# Patient Record
Sex: FEMALE | Race: WHITE | NOT HISPANIC OR LATINO | Employment: OTHER | URBAN - METROPOLITAN AREA
[De-identification: names, ages, dates, MRNs, and addresses within clinical notes are randomized per-mention and may not be internally consistent; named-entity substitution may affect disease eponyms.]

---

## 2017-06-28 ENCOUNTER — TRANSCRIBE ORDERS (OUTPATIENT)
Dept: ADMINISTRATIVE | Facility: HOSPITAL | Age: 82
End: 2017-06-28

## 2017-06-28 ENCOUNTER — HOSPITAL ENCOUNTER (OUTPATIENT)
Dept: GENERAL RADIOLOGY | Facility: HOSPITAL | Age: 82
Discharge: HOME | End: 2017-06-28
Admitting: PHYSICIAN ASSISTANT

## 2017-06-28 DIAGNOSIS — M79.672 LEFT FOOT PAIN: Primary | ICD-10-CM

## 2017-06-28 PROCEDURE — 73630 X-RAY EXAM OF FOOT: CPT

## 2019-11-27 ENCOUNTER — TRANSCRIBE ORDERS (OUTPATIENT)
Dept: ADMINISTRATIVE | Facility: HOSPITAL | Age: 84
End: 2019-11-27

## 2019-11-27 DIAGNOSIS — G60.9 HEREDITARY AND IDIOPATHIC PERIPHERAL NEUROPATHY: Primary | ICD-10-CM

## 2021-03-02 ENCOUNTER — IMMUNIZATION (OUTPATIENT)
Dept: VACCINE CLINIC | Facility: HOSPITAL | Age: 86
End: 2021-03-02

## 2021-03-02 PROCEDURE — 91300 HC SARSCOV02 VAC 30MCG/0.3ML IM: CPT | Performed by: INTERNAL MEDICINE

## 2021-03-02 PROCEDURE — 0001A: CPT | Performed by: INTERNAL MEDICINE

## 2021-03-23 ENCOUNTER — IMMUNIZATION (OUTPATIENT)
Dept: VACCINE CLINIC | Facility: HOSPITAL | Age: 86
End: 2021-03-23

## 2021-03-23 PROCEDURE — 91300 HC SARSCOV02 VAC 30MCG/0.3ML IM: CPT | Performed by: INTERNAL MEDICINE

## 2021-03-23 PROCEDURE — 0002A: CPT | Performed by: INTERNAL MEDICINE

## 2022-01-01 ENCOUNTER — HOSPITAL ENCOUNTER (EMERGENCY)
Facility: HOSPITAL | Age: 87
End: 2022-12-09
Attending: EMERGENCY MEDICINE | Admitting: EMERGENCY MEDICINE

## 2022-01-01 ENCOUNTER — TRANSCRIBE ORDERS (OUTPATIENT)
Dept: ADMINISTRATIVE | Facility: HOSPITAL | Age: 87
End: 2022-01-01

## 2022-01-01 ENCOUNTER — HOSPITAL ENCOUNTER (OUTPATIENT)
Dept: GENERAL RADIOLOGY | Facility: HOSPITAL | Age: 87
Discharge: HOME | End: 2022-12-08
Admitting: INTERNAL MEDICINE

## 2022-01-01 VITALS — SYSTOLIC BLOOD PRESSURE: 105 MMHG | DIASTOLIC BLOOD PRESSURE: 61 MMHG | HEART RATE: 101 BPM

## 2022-01-01 DIAGNOSIS — I46.9 CARDIAC ARREST: Primary | ICD-10-CM

## 2022-01-01 DIAGNOSIS — I46.9 PULSELESS ELECTRICAL ACTIVITY: ICD-10-CM

## 2022-01-01 DIAGNOSIS — R06.00 DYSPNEA, UNSPECIFIED TYPE: Primary | ICD-10-CM

## 2022-01-01 DIAGNOSIS — Z86.79 HISTORY OF HYPERTENSION: ICD-10-CM

## 2022-01-01 DIAGNOSIS — R06.02 SHORTNESS OF BREATH: ICD-10-CM

## 2022-01-01 PROCEDURE — 99284 EMERGENCY DEPT VISIT MOD MDM: CPT

## 2022-01-01 PROCEDURE — 71046 X-RAY EXAM CHEST 2 VIEWS: CPT

## 2022-01-01 PROCEDURE — 31500 INSERT EMERGENCY AIRWAY: CPT

## 2022-01-01 PROCEDURE — 25010000002 EPINEPHRINE 1 MG/10ML SOLUTION PREFILLED SYRINGE

## 2022-01-01 PROCEDURE — 94799 UNLISTED PULMONARY SVC/PX: CPT

## 2022-01-01 PROCEDURE — 94002 VENT MGMT INPAT INIT DAY: CPT

## 2022-01-01 PROCEDURE — 92950 HEART/LUNG RESUSCITATION CPR: CPT

## 2022-01-01 RX ORDER — EPINEPHRINE 0.1 MG/ML
SYRINGE (ML) INJECTION
Status: COMPLETED | OUTPATIENT
Start: 2022-01-01 | End: 2022-01-01

## 2022-01-01 RX ORDER — CALCIUM CHLORIDE 100 MG/ML
INJECTION INTRAVENOUS; INTRAVENTRICULAR
Status: COMPLETED | OUTPATIENT
Start: 2022-01-01 | End: 2022-01-01

## 2022-01-01 RX ADMIN — EPINEPHRINE 1 MG: 0.1 INJECTION INTRACARDIAC; INTRAVENOUS at 21:50

## 2022-01-01 RX ADMIN — SODIUM BICARBONATE 50 MEQ: 84 INJECTION, SOLUTION INTRAVENOUS at 21:46

## 2022-01-01 RX ADMIN — CALCIUM CHLORIDE 1 G: 100 INJECTION INTRAVENOUS; INTRAVENTRICULAR at 21:45

## 2022-01-01 RX ADMIN — EPINEPHRINE 1 MG: 0.1 INJECTION INTRACARDIAC; INTRAVENOUS at 21:44

## 2022-01-01 RX ADMIN — EPINEPHRINE 1 MG: 0.1 INJECTION INTRACARDIAC; INTRAVENOUS at 21:47

## 2022-12-09 NOTE — ED PROVIDER NOTES
[place]    EMERGENCY DEPARTMENT ENCOUNTER      Pt Name: Mahsa Larose  MRN: [id number]  YOB: 1934  Date of evaluation: 12/8/2022  Provider: Raghu Osorio MD    CHIEF COMPLAINT       Chief Complaint   Patient presents with   • Cardiac Arrest         HISTORY OF PRESENT ILLNESS  (Location/Symptom, Timing/Onset, Context/Setting, Quality, Duration, Modifying Factors, Severity.)   Mahsa Larose is a 88 y.o. female who presents to the emergency department in cardiac arrest.  Patient had witnessed arrest about 30 minutes prior to arrival.  She was apparently on the phone with family members he noticed that she was having significant difficulty breathing.  Recently diagnosed with respiratory infection and has been having some cough and dyspnea over the course of the past couple of days per EMS report.  Patient granddaughter on the phone noticed that patient was very tachypneic and so patient's son-in-law called EMS.  EMS states that that when they arrived, patient was pulseless and CPR was being performed by bystanders who witnessed the arrest.  Patient has been in PEA throughout the duration of EMS transport.  She has been administered 3 doses of epinephrine and 1 dose of bicarbonate without any change.  An LMA was placed prior to arrival.  They are unaware of any medical history.      Nursing notes were reviewed.    REVIEW OF SYSTEMS    (2-9 systems for level 4, 10 or more for level 5)   ROS:  Unable to obtain due to clinical condition      PAST MEDICAL HISTORY     Past Medical History:   Diagnosis Date   • Hypertension    • Neuropathy          SURGICAL HISTORY       Past Surgical History:   Procedure Laterality Date   • BREAST SURGERY     • HYSTERECTOMY     • MASTECTOMY Bilateral          CURRENT MEDICATIONS     No current facility-administered medications for this encounter.    Current Outpatient Medications:   •  olmesartan (BENICAR) 40 MG tablet, Take 1 tablet by mouth daily., Disp: 30 tablet,  Rfl: 0    ALLERGIES     Patient has no known allergies.    FAMILY HISTORY     No family history on file.       SOCIAL HISTORY       Social History     Socioeconomic History   • Marital status:    Tobacco Use   • Smoking status: Never   Substance and Sexual Activity   • Alcohol use: No   • Drug use: No         PHYSICAL EXAM    (up to 7 for level 4, 8 or more for level 5)     Vitals:    12/08/22 2150   BP: 105/61   Pulse: 101       Physical Exam  General: Pulseless, unresponsive  HEENT: Pupils fixed and dilated  Neck: Trachea midline.  Cardiac: Pulseless  Lungs: LMA in place, patient being bagged  Chest wall: No deformity  Abdomen: Non-distended  Musculoskeletal: No deformity.  Neuro: Motionless  Dermatology: Skin is cool and pale  Psych: Unable to assess          EMERGENCY DEPARTMENT COURSE and DIFFERENTIAL DIAGNOSIS/MDM:   Vitals:    Vitals:    12/08/22 2150   BP: 105/61   Pulse: 101            This patient presented in PEA arrest without any change in rhythm or regaining a pulse for around 30 minutes prior to arrival.  Given this, prognosis for this 88-year-old patient was extremely poor on arrival to the ED.  Additional doses of epinephrine in addition to bicarbonate, calcium chloride were administered after patient arrival.  Patient's LMA was exchanged for endotracheal tube.  High-quality CPR was continued until a total of 40 minutes downtime had been reached with ongoing PEA without pulse.  Further resuscitation efforts at that point were deemed to be futile.      MEDICATIONS ADMINISTERED IN ED:  Medications   EPINEPHrine (ADRENALIN) injection (1 mg Intravenous Given 12/8/22 2150)   calcium chloride injection (1 g Intravenous Given 12/8/22 2145)   sodium bicarbonate injection 8.4% (50 mEq Intravenous Given 12/8/22 2146)       PROCEDURES:    Endotracheal Intubation  Indication: Cardiac arrest  Consent: Emergent  Pre-procedure exam: LMA in place  Preparation: The patient was pre-oxygenated with LMA with  head in the upright position. Stable hemodynamics were ensured. Proper equipment at the bedside including multiple blades, endotracheal tubes, OPA/NPA, ETCO2, suction, bougie.  Medications: None  Technique: Once adequate paralysis and sedation were attained, the blade was advanced into the oropharynx. The glottis was identified and the tube was advanced into the airway under direct visualization. The tube was secured at 21 cm at the lip. Post intubation O2 sat was unknown.  Confirmation: Waveform ETCO2  Complications: None    CPR  Indication: PEA arrest  Performed following ACLS guidelines   Narrative: Total of 40 minutes of downtime/PEA arrest without any change in rhythm or getting a pulse with high-quality CPR and administration of several doses of epinephrine, bicarb, calcium chloride without any obvious reversible cause.          FINAL IMPRESSION      1. Cardiac arrest (HCC)    2. Pulseless electrical activity (HCC)    3. Shortness of breath    4. History of hypertension          DISPOSITION/PLAN     ED Disposition     ED Disposition       Condition   --    Comment   --               Raghu Osorio MD  Attending Emergency Physician               aRghu Osorio MD  [phone]